# Patient Record
Sex: MALE | Race: WHITE | Employment: PART TIME | URBAN - METROPOLITAN AREA
[De-identification: names, ages, dates, MRNs, and addresses within clinical notes are randomized per-mention and may not be internally consistent; named-entity substitution may affect disease eponyms.]

---

## 2019-10-15 ENCOUNTER — ANESTHESIA EVENT (OUTPATIENT)
Dept: SURGERY | Facility: CLINIC | Age: 39
DRG: 138 | End: 2019-10-15

## 2019-10-15 ENCOUNTER — ANESTHESIA (OUTPATIENT)
Dept: SURGERY | Facility: CLINIC | Age: 39
DRG: 138 | End: 2019-10-15

## 2019-10-15 ENCOUNTER — APPOINTMENT (OUTPATIENT)
Dept: CT IMAGING | Facility: CLINIC | Age: 39
DRG: 138 | End: 2019-10-15
Attending: EMERGENCY MEDICINE

## 2019-10-15 ENCOUNTER — HOSPITAL ENCOUNTER (INPATIENT)
Facility: CLINIC | Age: 39
LOS: 1 days | Discharge: HOME | DRG: 138 | End: 2019-10-16
Attending: EMERGENCY MEDICINE | Admitting: DENTIST

## 2019-10-15 DIAGNOSIS — K12.2 LUDWIG'S ANGINA: ICD-10-CM

## 2019-10-15 DIAGNOSIS — L02.01 FACIAL ABSCESS: Primary | ICD-10-CM

## 2019-10-15 LAB
ANION GAP SERPL CALCULATED.3IONS-SCNC: 14 MMOL/L (ref 3–14)
BASOPHILS # BLD AUTO: 0 10E9/L (ref 0–0.2)
BASOPHILS NFR BLD AUTO: 0.2 %
BUN SERPL-MCNC: 16 MG/DL (ref 7–30)
CALCIUM SERPL-MCNC: 9.3 MG/DL (ref 8.5–10.1)
CHLORIDE SERPL-SCNC: 100 MMOL/L (ref 94–109)
CO2 SERPL-SCNC: 22 MMOL/L (ref 20–32)
CREAT BLD-MCNC: 0.9 MG/DL (ref 0.66–1.25)
CREAT SERPL-MCNC: 0.96 MG/DL (ref 0.66–1.25)
DIFFERENTIAL METHOD BLD: ABNORMAL
EOSINOPHIL # BLD AUTO: 0 10E9/L (ref 0–0.7)
EOSINOPHIL NFR BLD AUTO: 0.1 %
ERYTHROCYTE [DISTWIDTH] IN BLOOD BY AUTOMATED COUNT: 14 % (ref 10–15)
GFR SERPL CREATININE-BSD FRML MDRD: >90 ML/MIN/{1.73_M2}
GFR SERPL CREATININE-BSD FRML MDRD: >90 ML/MIN/{1.73_M2}
GLUCOSE BLDC GLUCOMTR-MCNC: 83 MG/DL (ref 70–99)
GLUCOSE SERPL-MCNC: 100 MG/DL (ref 70–99)
HCT VFR BLD AUTO: 42.9 % (ref 40–53)
HGB BLD-MCNC: 13.9 G/DL (ref 13.3–17.7)
IMM GRANULOCYTES # BLD: 0.1 10E9/L (ref 0–0.4)
IMM GRANULOCYTES NFR BLD: 0.5 %
LACTATE BLD-SCNC: 0.7 MMOL/L (ref 0.7–2)
LYMPHOCYTES # BLD AUTO: 1.4 10E9/L (ref 0.8–5.3)
LYMPHOCYTES NFR BLD AUTO: 8.4 %
MCH RBC QN AUTO: 27.3 PG (ref 26.5–33)
MCHC RBC AUTO-ENTMCNC: 32.4 G/DL (ref 31.5–36.5)
MCV RBC AUTO: 84 FL (ref 78–100)
MONOCYTES # BLD AUTO: 1.7 10E9/L (ref 0–1.3)
MONOCYTES NFR BLD AUTO: 10.1 %
NEUTROPHILS # BLD AUTO: 13.1 10E9/L (ref 1.6–8.3)
NEUTROPHILS NFR BLD AUTO: 80.7 %
NRBC # BLD AUTO: 0 10*3/UL
NRBC BLD AUTO-RTO: 0 /100
PLATELET # BLD AUTO: 241 10E9/L (ref 150–450)
POTASSIUM SERPL-SCNC: 3.7 MMOL/L (ref 3.4–5.3)
RBC # BLD AUTO: 5.1 10E12/L (ref 4.4–5.9)
SODIUM SERPL-SCNC: 136 MMOL/L (ref 133–144)
WBC # BLD AUTO: 16.3 10E9/L (ref 4–11)

## 2019-10-15 PROCEDURE — 00000146 ZZHCL STATISTIC GLUCOSE BY METER IP

## 2019-10-15 PROCEDURE — 80048 BASIC METABOLIC PNL TOTAL CA: CPT | Performed by: EMERGENCY MEDICINE

## 2019-10-15 PROCEDURE — 25000565 ZZH ISOFLURANE, EA 15 MIN: Performed by: DENTIST

## 2019-10-15 PROCEDURE — 25000128 H RX IP 250 OP 636: Performed by: ANESTHESIOLOGY

## 2019-10-15 PROCEDURE — 85025 COMPLETE CBC W/AUTO DIFF WBC: CPT | Performed by: EMERGENCY MEDICINE

## 2019-10-15 PROCEDURE — 96366 THER/PROPH/DIAG IV INF ADDON: CPT

## 2019-10-15 PROCEDURE — 82565 ASSAY OF CREATININE: CPT

## 2019-10-15 PROCEDURE — 25800030 ZZH RX IP 258 OP 636: Performed by: ANESTHESIOLOGY

## 2019-10-15 PROCEDURE — 96365 THER/PROPH/DIAG IV INF INIT: CPT

## 2019-10-15 PROCEDURE — 87076 CULTURE ANAEROBE IDENT EACH: CPT | Performed by: DENTIST

## 2019-10-15 PROCEDURE — 87070 CULTURE OTHR SPECIMN AEROBIC: CPT | Performed by: DENTIST

## 2019-10-15 PROCEDURE — 93010 ELECTROCARDIOGRAM REPORT: CPT | Mod: 59 | Performed by: INTERNAL MEDICINE

## 2019-10-15 PROCEDURE — 25000566 ZZH SEVOFLURANE, EA 15 MIN: Performed by: DENTIST

## 2019-10-15 PROCEDURE — 87205 SMEAR GRAM STAIN: CPT | Performed by: DENTIST

## 2019-10-15 PROCEDURE — 25000125 ZZHC RX 250: Performed by: STUDENT IN AN ORGANIZED HEALTH CARE EDUCATION/TRAINING PROGRAM

## 2019-10-15 PROCEDURE — 87186 SC STD MICRODIL/AGAR DIL: CPT | Performed by: DENTIST

## 2019-10-15 PROCEDURE — 83605 ASSAY OF LACTIC ACID: CPT | Performed by: EMERGENCY MEDICINE

## 2019-10-15 PROCEDURE — 87185 SC STD ENZYME DETCJ PER NZM: CPT | Performed by: DENTIST

## 2019-10-15 PROCEDURE — 71000015 ZZH RECOVERY PHASE 1 LEVEL 2 EA ADDTL HR: Performed by: DENTIST

## 2019-10-15 PROCEDURE — 71000014 ZZH RECOVERY PHASE 1 LEVEL 2 FIRST HR: Performed by: DENTIST

## 2019-10-15 PROCEDURE — 25000128 H RX IP 250 OP 636: Performed by: EMERGENCY MEDICINE

## 2019-10-15 PROCEDURE — 40000170 ZZH STATISTIC PRE-PROCEDURE ASSESSMENT II: Performed by: DENTIST

## 2019-10-15 PROCEDURE — 0J910ZZ DRAINAGE OF FACE SUBCUTANEOUS TISSUE AND FASCIA, OPEN APPROACH: ICD-10-PCS | Performed by: DENTIST

## 2019-10-15 PROCEDURE — 87077 CULTURE AEROBIC IDENTIFY: CPT | Performed by: DENTIST

## 2019-10-15 PROCEDURE — 99285 EMERGENCY DEPT VISIT HI MDM: CPT | Mod: Z6 | Performed by: EMERGENCY MEDICINE

## 2019-10-15 PROCEDURE — 27210794 ZZH OR GENERAL SUPPLY STERILE: Performed by: DENTIST

## 2019-10-15 PROCEDURE — 25800030 ZZH RX IP 258 OP 636: Performed by: STUDENT IN AN ORGANIZED HEALTH CARE EDUCATION/TRAINING PROGRAM

## 2019-10-15 PROCEDURE — 36000051 ZZH SURGERY LEVEL 2 1ST 30 MIN - UMMC: Performed by: DENTIST

## 2019-10-15 PROCEDURE — 99285 EMERGENCY DEPT VISIT HI MDM: CPT | Mod: 25

## 2019-10-15 PROCEDURE — 0CDXXZ1 EXTRACTION OF LOWER TOOTH, MULTIPLE, EXTERNAL APPROACH: ICD-10-PCS | Performed by: DENTIST

## 2019-10-15 PROCEDURE — 96375 TX/PRO/DX INJ NEW DRUG ADDON: CPT

## 2019-10-15 PROCEDURE — 70491 CT SOFT TISSUE NECK W/DYE: CPT

## 2019-10-15 PROCEDURE — 12000001 ZZH R&B MED SURG/OB UMMC

## 2019-10-15 PROCEDURE — 36000053 ZZH SURGERY LEVEL 2 EA 15 ADDTL MIN - UMMC: Performed by: DENTIST

## 2019-10-15 PROCEDURE — 37000008 ZZH ANESTHESIA TECHNICAL FEE, 1ST 30 MIN: Performed by: DENTIST

## 2019-10-15 PROCEDURE — 25000128 H RX IP 250 OP 636: Performed by: STUDENT IN AN ORGANIZED HEALTH CARE EDUCATION/TRAINING PROGRAM

## 2019-10-15 PROCEDURE — 37000009 ZZH ANESTHESIA TECHNICAL FEE, EACH ADDTL 15 MIN: Performed by: DENTIST

## 2019-10-15 PROCEDURE — 25000125 ZZHC RX 250: Performed by: DENTIST

## 2019-10-15 PROCEDURE — 87075 CULTR BACTERIA EXCEPT BLOOD: CPT | Performed by: DENTIST

## 2019-10-15 PROCEDURE — 40000065 ZZH STATISTIC EKG NON-CHARGEABLE

## 2019-10-15 RX ORDER — ONDANSETRON 2 MG/ML
4 INJECTION INTRAMUSCULAR; INTRAVENOUS EVERY 6 HOURS PRN
Status: DISCONTINUED | OUTPATIENT
Start: 2019-10-15 | End: 2019-10-16 | Stop reason: HOSPADM

## 2019-10-15 RX ORDER — AMOXICILLIN 250 MG
1 CAPSULE ORAL 2 TIMES DAILY
Status: DISCONTINUED | OUTPATIENT
Start: 2019-10-15 | End: 2019-10-16 | Stop reason: HOSPADM

## 2019-10-15 RX ORDER — PROPOFOL 10 MG/ML
INJECTION, EMULSION INTRAVENOUS PRN
Status: DISCONTINUED | OUTPATIENT
Start: 2019-10-15 | End: 2019-10-15

## 2019-10-15 RX ORDER — NALOXONE HYDROCHLORIDE 0.4 MG/ML
.1-.4 INJECTION, SOLUTION INTRAMUSCULAR; INTRAVENOUS; SUBCUTANEOUS
Status: DISCONTINUED | OUTPATIENT
Start: 2019-10-15 | End: 2019-10-16 | Stop reason: HOSPADM

## 2019-10-15 RX ORDER — SODIUM CHLORIDE 9 MG/ML
INJECTION, SOLUTION INTRAVENOUS CONTINUOUS
Status: DISCONTINUED | OUTPATIENT
Start: 2019-10-15 | End: 2019-10-16 | Stop reason: HOSPADM

## 2019-10-15 RX ORDER — SODIUM CHLORIDE, SODIUM LACTATE, POTASSIUM CHLORIDE, CALCIUM CHLORIDE 600; 310; 30; 20 MG/100ML; MG/100ML; MG/100ML; MG/100ML
INJECTION, SOLUTION INTRAVENOUS CONTINUOUS PRN
Status: DISCONTINUED | OUTPATIENT
Start: 2019-10-15 | End: 2019-10-15

## 2019-10-15 RX ORDER — PROCHLORPERAZINE 25 MG
25 SUPPOSITORY, RECTAL RECTAL EVERY 12 HOURS PRN
Status: DISCONTINUED | OUTPATIENT
Start: 2019-10-15 | End: 2019-10-16 | Stop reason: HOSPADM

## 2019-10-15 RX ORDER — HYDROMORPHONE HYDROCHLORIDE 1 MG/ML
0.5 INJECTION, SOLUTION INTRAMUSCULAR; INTRAVENOUS; SUBCUTANEOUS ONCE
Status: COMPLETED | OUTPATIENT
Start: 2019-10-15 | End: 2019-10-15

## 2019-10-15 RX ORDER — PROCHLORPERAZINE MALEATE 5 MG
10 TABLET ORAL EVERY 6 HOURS PRN
Status: DISCONTINUED | OUTPATIENT
Start: 2019-10-15 | End: 2019-10-16 | Stop reason: HOSPADM

## 2019-10-15 RX ORDER — DEXAMETHASONE SODIUM PHOSPHATE 4 MG/ML
INJECTION, SOLUTION INTRA-ARTICULAR; INTRALESIONAL; INTRAMUSCULAR; INTRAVENOUS; SOFT TISSUE PRN
Status: DISCONTINUED | OUTPATIENT
Start: 2019-10-15 | End: 2019-10-15

## 2019-10-15 RX ORDER — IBUPROFEN 600 MG/1
600 TABLET, FILM COATED ORAL EVERY 6 HOURS
Status: DISCONTINUED | OUTPATIENT
Start: 2019-10-16 | End: 2019-10-16 | Stop reason: HOSPADM

## 2019-10-15 RX ORDER — OXYCODONE HYDROCHLORIDE 5 MG/1
5 TABLET ORAL EVERY 4 HOURS PRN
Status: DISCONTINUED | OUTPATIENT
Start: 2019-10-15 | End: 2019-10-16

## 2019-10-15 RX ORDER — BUPIVACAINE HYDROCHLORIDE AND EPINEPHRINE 5; 5 MG/ML; UG/ML
INJECTION, SOLUTION PERINEURAL PRN
Status: DISCONTINUED | OUTPATIENT
Start: 2019-10-15 | End: 2019-10-15 | Stop reason: HOSPADM

## 2019-10-15 RX ORDER — SODIUM CHLORIDE, SODIUM LACTATE, POTASSIUM CHLORIDE, CALCIUM CHLORIDE 600; 310; 30; 20 MG/100ML; MG/100ML; MG/100ML; MG/100ML
INJECTION, SOLUTION INTRAVENOUS CONTINUOUS
Status: DISCONTINUED | OUTPATIENT
Start: 2019-10-15 | End: 2019-10-15 | Stop reason: HOSPADM

## 2019-10-15 RX ORDER — ONDANSETRON 4 MG/1
4 TABLET, ORALLY DISINTEGRATING ORAL EVERY 30 MIN PRN
Status: DISCONTINUED | OUTPATIENT
Start: 2019-10-15 | End: 2019-10-15 | Stop reason: HOSPADM

## 2019-10-15 RX ORDER — AMPICILLIN AND SULBACTAM 2; 1 G/1; G/1
3 INJECTION, POWDER, FOR SOLUTION INTRAMUSCULAR; INTRAVENOUS ONCE
Status: COMPLETED | OUTPATIENT
Start: 2019-10-15 | End: 2019-10-15

## 2019-10-15 RX ORDER — FENTANYL CITRATE 50 UG/ML
INJECTION, SOLUTION INTRAMUSCULAR; INTRAVENOUS PRN
Status: DISCONTINUED | OUTPATIENT
Start: 2019-10-15 | End: 2019-10-15

## 2019-10-15 RX ORDER — ONDANSETRON 2 MG/ML
INJECTION INTRAMUSCULAR; INTRAVENOUS PRN
Status: DISCONTINUED | OUTPATIENT
Start: 2019-10-15 | End: 2019-10-15

## 2019-10-15 RX ORDER — CHLORHEXIDINE GLUCONATE ORAL RINSE 1.2 MG/ML
15 SOLUTION DENTAL 2 TIMES DAILY
Status: DISCONTINUED | OUTPATIENT
Start: 2019-10-16 | End: 2019-10-16 | Stop reason: HOSPADM

## 2019-10-15 RX ORDER — AMOXICILLIN 250 MG
2 CAPSULE ORAL 2 TIMES DAILY
Status: DISCONTINUED | OUTPATIENT
Start: 2019-10-15 | End: 2019-10-16 | Stop reason: HOSPADM

## 2019-10-15 RX ORDER — HYDROMORPHONE HYDROCHLORIDE 1 MG/ML
.3-.5 INJECTION, SOLUTION INTRAMUSCULAR; INTRAVENOUS; SUBCUTANEOUS EVERY 5 MIN PRN
Status: DISCONTINUED | OUTPATIENT
Start: 2019-10-15 | End: 2019-10-15 | Stop reason: HOSPADM

## 2019-10-15 RX ORDER — NALOXONE HYDROCHLORIDE 0.4 MG/ML
.1-.4 INJECTION, SOLUTION INTRAMUSCULAR; INTRAVENOUS; SUBCUTANEOUS
Status: DISCONTINUED | OUTPATIENT
Start: 2019-10-15 | End: 2019-10-15

## 2019-10-15 RX ORDER — ACETAMINOPHEN 325 MG/1
650 TABLET ORAL EVERY 6 HOURS SCHEDULED
Status: DISCONTINUED | OUTPATIENT
Start: 2019-10-16 | End: 2019-10-16 | Stop reason: HOSPADM

## 2019-10-15 RX ORDER — AMPICILLIN AND SULBACTAM 2; 1 G/1; G/1
3 INJECTION, POWDER, FOR SOLUTION INTRAMUSCULAR; INTRAVENOUS EVERY 6 HOURS SCHEDULED
Status: DISCONTINUED | OUTPATIENT
Start: 2019-10-16 | End: 2019-10-16 | Stop reason: HOSPADM

## 2019-10-15 RX ORDER — ONDANSETRON 2 MG/ML
4 INJECTION INTRAMUSCULAR; INTRAVENOUS EVERY 30 MIN PRN
Status: DISCONTINUED | OUTPATIENT
Start: 2019-10-15 | End: 2019-10-15 | Stop reason: HOSPADM

## 2019-10-15 RX ORDER — ONDANSETRON 4 MG/1
4 TABLET, ORALLY DISINTEGRATING ORAL EVERY 6 HOURS PRN
Status: DISCONTINUED | OUTPATIENT
Start: 2019-10-15 | End: 2019-10-16 | Stop reason: HOSPADM

## 2019-10-15 RX ORDER — OXYCODONE HYDROCHLORIDE 5 MG/1
5-10 TABLET ORAL EVERY 4 HOURS PRN
Status: DISCONTINUED | OUTPATIENT
Start: 2019-10-15 | End: 2019-10-16 | Stop reason: HOSPADM

## 2019-10-15 RX ORDER — IOPAMIDOL 755 MG/ML
100 INJECTION, SOLUTION INTRAVASCULAR ONCE
Status: COMPLETED | OUTPATIENT
Start: 2019-10-15 | End: 2019-10-15

## 2019-10-15 RX ORDER — FENTANYL CITRATE 50 UG/ML
25-50 INJECTION, SOLUTION INTRAMUSCULAR; INTRAVENOUS
Status: DISCONTINUED | OUTPATIENT
Start: 2019-10-15 | End: 2019-10-15 | Stop reason: HOSPADM

## 2019-10-15 RX ORDER — LIDOCAINE 40 MG/G
CREAM TOPICAL
Status: DISCONTINUED | OUTPATIENT
Start: 2019-10-15 | End: 2019-10-16 | Stop reason: HOSPADM

## 2019-10-15 RX ADMIN — SODIUM CHLORIDE, POTASSIUM CHLORIDE, SODIUM LACTATE AND CALCIUM CHLORIDE: 600; 310; 30; 20 INJECTION, SOLUTION INTRAVENOUS at 22:45

## 2019-10-15 RX ADMIN — HYDROMORPHONE HYDROCHLORIDE 0.5 MG: 1 INJECTION, SOLUTION INTRAMUSCULAR; INTRAVENOUS; SUBCUTANEOUS at 22:18

## 2019-10-15 RX ADMIN — HYDROMORPHONE HYDROCHLORIDE 0.5 MG: 1 INJECTION, SOLUTION INTRAMUSCULAR; INTRAVENOUS; SUBCUTANEOUS at 18:31

## 2019-10-15 RX ADMIN — FENTANYL CITRATE 50 MCG: 50 INJECTION, SOLUTION INTRAMUSCULAR; INTRAVENOUS at 20:37

## 2019-10-15 RX ADMIN — IOPAMIDOL 100 ML: 755 INJECTION, SOLUTION INTRAVENOUS at 17:50

## 2019-10-15 RX ADMIN — FENTANYL CITRATE 100 MCG: 50 INJECTION, SOLUTION INTRAMUSCULAR; INTRAVENOUS at 20:11

## 2019-10-15 RX ADMIN — FENTANYL CITRATE 100 MCG: 50 INJECTION, SOLUTION INTRAMUSCULAR; INTRAVENOUS at 20:01

## 2019-10-15 RX ADMIN — FENTANYL CITRATE 50 MCG: 50 INJECTION INTRAMUSCULAR; INTRAVENOUS at 21:42

## 2019-10-15 RX ADMIN — HYDROMORPHONE HYDROCHLORIDE 0.5 MG: 1 INJECTION, SOLUTION INTRAMUSCULAR; INTRAVENOUS; SUBCUTANEOUS at 21:59

## 2019-10-15 RX ADMIN — AMPICILLIN SODIUM AND SULBACTAM SODIUM 3 G: 2; 1 INJECTION, POWDER, FOR SOLUTION INTRAMUSCULAR; INTRAVENOUS at 16:53

## 2019-10-15 RX ADMIN — SODIUM CHLORIDE 1000 ML: 9 INJECTION, SOLUTION INTRAVENOUS at 16:52

## 2019-10-15 RX ADMIN — FENTANYL CITRATE 25 MCG: 50 INJECTION INTRAMUSCULAR; INTRAVENOUS at 21:28

## 2019-10-15 RX ADMIN — PROPOFOL 150 MG: 10 INJECTION, EMULSION INTRAVENOUS at 20:01

## 2019-10-15 RX ADMIN — SODIUM CHLORIDE, POTASSIUM CHLORIDE, SODIUM LACTATE AND CALCIUM CHLORIDE: 600; 310; 30; 20 INJECTION, SOLUTION INTRAVENOUS at 19:56

## 2019-10-15 RX ADMIN — FENTANYL CITRATE 50 MCG: 50 INJECTION INTRAMUSCULAR; INTRAVENOUS at 21:32

## 2019-10-15 RX ADMIN — ROCURONIUM BROMIDE 10 MG: 10 INJECTION INTRAVENOUS at 20:20

## 2019-10-15 RX ADMIN — ONDANSETRON 4 MG: 2 INJECTION INTRAMUSCULAR; INTRAVENOUS at 20:50

## 2019-10-15 RX ADMIN — FENTANYL CITRATE 50 MCG: 50 INJECTION INTRAMUSCULAR; INTRAVENOUS at 21:52

## 2019-10-15 RX ADMIN — FENTANYL CITRATE 25 MCG: 50 INJECTION INTRAMUSCULAR; INTRAVENOUS at 21:37

## 2019-10-15 RX ADMIN — SUGAMMADEX 200 MG: 100 INJECTION, SOLUTION INTRAVENOUS at 20:49

## 2019-10-15 RX ADMIN — ROCURONIUM BROMIDE 60 MG: 10 INJECTION INTRAVENOUS at 20:04

## 2019-10-15 RX ADMIN — DEXAMETHASONE SODIUM PHOSPHATE 8 MG: 4 INJECTION, SOLUTION INTRA-ARTICULAR; INTRALESIONAL; INTRAMUSCULAR; INTRAVENOUS; SOFT TISSUE at 20:01

## 2019-10-15 RX ADMIN — SODIUM CHLORIDE 1000 ML: 900 INJECTION, SOLUTION INTRAVENOUS at 18:39

## 2019-10-15 ASSESSMENT — ENCOUNTER SYMPTOMS
FEVER: 1
SHORTNESS OF BREATH: 0
ABDOMINAL PAIN: 0

## 2019-10-15 NOTE — ED TRIAGE NOTES
Pt is Peruvian speaking only. Pt arrives from dental clinic with known oral abscess.MD aware. Pt reports that he hasn't had much to eat during the last few days. Pt reports jaw pain/swelling/discomfort.

## 2019-10-15 NOTE — ED PROVIDER NOTES
History     Chief Complaint   Patient presents with     Jaw Pain     Oral Abcess     HPI  Pito Pitts is a 39 year old male who had a filling come out of his right lower molar last week.  He has worsening toothache there and now swelling and to the space under his jaw, becoming much worse over the last 2 days.  He has not been able to swallow or drink since yesterday.  He feels that his breathing is comfortable but is more comfortable standing.  He has fevers and severe pain in that area.  He has no past history of infections there and is currently not on antibiotics.  He was seen in the oral surgery clinic where there is suspicious for Leobardo's angina and referred in the ED for CT and preparation for the OR.    I have reviewed the Medications, Allergies, Past Medical and Surgical History, and Social History in the Epic system.    Review of Systems   Constitutional: Positive for fever.   Respiratory: Negative for shortness of breath.    Cardiovascular: Negative for chest pain.   Gastrointestinal: Negative for abdominal pain.   All other systems reviewed and are negative.      Physical Exam   BP: (!) 151/99  Pulse: 112  Heart Rate: 129  Temp: 100.6  F (38.1  C)  Resp: 12  Weight: 105.7 kg (233 lb 1.6 oz)  SpO2: 100 %      Physical Exam  Constitutional:       General: He is not in acute distress.     Appearance: He is not diaphoretic.   HENT:      Head: Atraumatic.      Jaw: Trismus present.        Mouth/Throat:      Mouth: Mucous membranes are dry.      Pharynx: No oropharyngeal exudate.   Eyes:      General: No scleral icterus.     Pupils: Pupils are equal, round, and reactive to light.   Cardiovascular:      Heart sounds: Normal heart sounds.   Pulmonary:      Effort: No respiratory distress.      Breath sounds: Normal breath sounds.   Abdominal:      General: Bowel sounds are normal.      Palpations: Abdomen is soft.      Tenderness: There is no tenderness.   Musculoskeletal:         General: No tenderness.    Skin:     General: Skin is warm.      Findings: No rash.         ED Course        Procedures             Critical Care time:  none             Labs Ordered and Resulted from Time of ED Arrival Up to the Time of Departure from the ED   CBC WITH PLATELETS DIFFERENTIAL - Abnormal; Notable for the following components:       Result Value    WBC 16.3 (*)     Absolute Neutrophil 13.1 (*)     Absolute Monocytes 1.7 (*)     All other components within normal limits   BASIC METABOLIC PANEL - Abnormal; Notable for the following components:    Glucose 100 (*)     All other components within normal limits   LACTIC ACID WHOLE BLOOD   CREATININE POCT            Assessments & Plan (with Medical Decision Making)   The patient has signs of Leobardo's angina on exam consistent with the concerns of the oral surgeon.  He has tachycardia, low-grade fever and elevated white blood cell count.  He does appear dehydrated and is getting IV fluids and Unasyn was started in the ED.  CT scan was arranged and I updated the oral surgery team at 1700 with the patient's status.    CT scan confirmed the submandibular abscess.  Oral surgery is here seeing the patient.  He was given IV Dilaudid for pain, continues to get IV fluids and his antibiotics and will be transferred to the operating room for incision and drainage.    I have reviewed the nursing notes.    I have reviewed the findings, diagnosis, plan and need for follow up with the patient.    There are no discharge medications for this patient.      Final diagnoses:   Leobardo's angina       10/15/2019   [facility], [place], EMERGENCY DEPARTMENT     Keven Duval MD  10/15/19 2015

## 2019-10-15 NOTE — H&P
ORAL & MAXILLOFACIAL SURGERY CONSULTATION   Name: Pito Pitts  MRN: [id number]  : 1980  Date of Service: 10/15/2019          ID:  39 year old male patient presenting with with pain and swelling of the submandibular space.    Assessment/Plan: 39 year old man patient with decayed and non restorable tooth #30 and 31 and submental space infection. We plan removal of tooth #30 and 31 with incision and drainage of the submental space in the OR        Recommendations:   IV ABX (Unasyn)  NPO  HOB 30   Continuous pulse oximetry  Page OMFS resident on call for questions or concerns      The patient's case was discussed with Chief Resident, Dr. Dobbs who discussed with staff surgeon, Dr. Joya.    History of Present Illness:  The patient reports pain and swelling that began on Thursday 10/10/19 and has been getting progressively worse. The patient was referred by [facility] ED for evaluation.  The patient reports odynophagia / denies f/c/n/v/ dyspnea, dysphagia, and SOB.  The patient has been taking ibuprofen to help manage the pain. Has not been taking antibiotics.   Patient is able to control secretions.     No Known Allergies    There is no problem list on file for this patient.      Past Medical History:   Diagnosis Date     HTN (hypertension)        No past surgical history on file.    Relevant Patient or Family Anesthesia History:  Anesthetic/Sedation History: Denies past adverse experience with sedation / anesthesia      Current Facility-Administered Medications:      0.9% sodium chloride BOLUS, 1,000 mL, Intravenous, Once, Last Rate: 1,000 mL/hr at 10/15/19 1652, 1,000 mL at 10/15/19 1652 **FOLLOWED BY** 0.9% sodium chloride BOLUS, 1,000 mL, Intravenous, Once **FOLLOWED BY** sodium chloride 0.9% infusion, , Intravenous, Continuous, Keven Duval MD     ampicillin-sulbactam (UNASYN) 3 g vial to attach to  mL bag, 3 g, Intravenous, Once, Keven Duval MD, Last Rate: 200 mL/hr at  10/15/19 1653, 3 g at 10/15/19 1653  No current outpatient medications on file.    Social History     Socioeconomic History     Marital status:      Spouse name: Not on file     Number of children: Not on file     Years of education: Not on file     Highest education level: Not on file   Occupational History     Not on file   Social Needs     Financial resource strain: Not on file     Food insecurity:     Worry: Not on file     Inability: Not on file     Transportation needs:     Medical: Not on file     Non-medical: Not on file   Tobacco Use     Smoking status: Not on file   Substance and Sexual Activity     Alcohol use: Not on file     Drug use: Not on file     Sexual activity: Not on file   Lifestyle     Physical activity:     Days per week: Not on file     Minutes per session: Not on file     Stress: Not on file   Relationships     Social connections:     Talks on phone: Not on file     Gets together: Not on file     Attends Anabaptism service: Not on file     Active member of club or organization: Not on file     Attends meetings of clubs or organizations: Not on file     Relationship status: Not on file     Intimate partner violence:     Fear of current or ex partner: Not on file     Emotionally abused: Not on file     Physically abused: Not on file     Forced sexual activity: Not on file   Other Topics Concern     Not on file   Social History Narrative     Not on file       No family history on file.    Review of Systems:  As per HPI otherwise noncontributory    Clinical Exam:  B/P: 151/99, T: 100.6, P: Data Unavailable, R: 12    Lab Results   Component Value Date    WBC 16.3 (H) 10/15/2019    HGB 13.9 10/15/2019    HCT 42.9 10/15/2019     10/15/2019     10/15/2019    POTASSIUM 3.7 10/15/2019    CHLORIDE 100 10/15/2019    CO2 PENDING 10/15/2019    BUN PENDING 10/15/2019    CR PENDING 10/15/2019    GLC PENDING 10/15/2019       Extraoral Exam:    Patient is alert, orientated and in no acute  distress   CN II-XII grossly intact   There is appreciable facial swelling of submental region of the face.    The swelling does slightly extend into the neck periorbital region.    Intraoral Exam:    The patient does not have trismus with maximum incisal opening of 35 mm though guarding due to pain.   There is no evidence of a sinus tract associated with dentition   There is no vestibular swelling   The floor of mouth is soft and slightly elevated    The tongue is not elevated.   There is no lateral pharyngeal swelling with the uvula midline   There is no  palatal draping present.   The patient was quite tender to palpation in the area of #31 and 32, and floor of mouth   Oral airway is patent with a Mallampati IV classification   Gross decay noted on teeth #30 and 31      Cardiovascular:  Regular rate and rhythm without any appreciable murmurs, gallops or rubs.     Respiratory: Lungs were clear to auscultation bilaterally without any wheezing or rhonchi.   Abdomen:  The abdomen was soft, nontender, nondistended with normoactive bowel sounds.   Musculoskeletal: The extremities reveal 5/5 strength in bilateral upper as well as bilateral lower extremities.           Radiographic Exam:  CT Face with contrast shows gross decay on tooth #30 and 31 with periapical radiolucencies at apex of roots and associated submandibular space infection. No other bony pathology noted.    Assessment/Plan:      Risks, complications and alternatives of tooth extraction and incision and drainage procedures were discussed and questions were answered.  Among all potential risks and complications, I emphasized the potential for pain, bleeding, swelling, infection, localized alveolar osteitis (dry socket), temporary and permanent lingual and inferior alveolar nerve injury, oroantral (sinus) communication, oronasal communication, jaw fracture, damage to adjacent teeth and tissue, joint discomfort, bone/tooth fragments, recurrence of infection,  need for additional procedures, facial nerve injury, scarring, limited mouth opening, drain placement, aspiration and anesthetic mishap.       Terrence Ocasio DDS  [facility] [place] PGY-3  P: [phone]

## 2019-10-15 NOTE — ANESTHESIA PREPROCEDURE EVALUATION
Anesthesia Pre-Procedure Evaluation    Patient: Pito Pitts   MRN:     [id number] Gender:   male   Age:    39 year old :      1980        Preoperative Diagnosis: * No pre-op diagnosis entered *   Procedure(s):  INCISION AND DRAINAGE DENTAL EXTRACTION     Past Medical History:   Diagnosis Date     HTN (hypertension)       No past surgical history on file.       Anesthesia Evaluation     . Pt has not had prior anesthetic            ROS/MED HX    ENT/Pulmonary: Comment: Submental space infection        Neurologic:  - neg neurologic ROS     Cardiovascular:     (+) hypertension----. : . . . :. .       METS/Exercise Tolerance:  >4 METS   Hematologic:  - neg hematologic  ROS       Musculoskeletal:  - neg musculoskeletal ROS       GI/Hepatic:  - neg GI/hepatic ROS       Renal/Genitourinary:  - ROS Renal section negative       Endo:  - neg endo ROS       Psychiatric:  - neg psychiatric ROS       Infectious Disease: Comment: Submental space infection with infected teeth  (+) Recent Fever, Other Infectious Disease oral abscess      Malignancy:      - no malignancy   Other:    - neg other ROS                     PHYSICAL EXAM:   Mental Status/Neuro: A/A/O   Airway: Facies: Feasible  Mallampati: IV  Mouth/Opening: Limited  TM distance: > 6 cm  Neck ROM: Full   Respiratory: Auscultation: CTAB     Resp. Rate: Normal     Resp. Effort: Normal      CV: Rhythm: Regular  Rate: Age appropriate  Heart: Normal Sounds  Edema: None   Comments: Able to open mouth 2-3 fingerbreaths, limited by pain     Dental: Normal Dentition                LABS:  CBC:   Lab Results   Component Value Date    WBC 16.3 (H) 10/15/2019    HGB 13.9 10/15/2019    HCT 42.9 10/15/2019     10/15/2019     BMP:   Lab Results   Component Value Date     10/15/2019    POTASSIUM 3.7 10/15/2019    CHLORIDE 100 10/15/2019    CO2 22 10/15/2019    BUN 16 10/15/2019    CR 0.96 10/15/2019     (H) 10/15/2019     COAGS: No results found for: PTT,  INR, FIBR  POC: No results found for: BGM, HCG, HCGS  OTHER:   Lab Results   Component Value Date    LACT 0.7 10/15/2019    MACKENZIE 9.3 10/15/2019        Preop Vitals    BP Readings from Last 3 Encounters:   10/15/19 (!) 155/98    Pulse Readings from Last 3 Encounters:   10/15/19 112      Resp Readings from Last 3 Encounters:   10/15/19 16    SpO2 Readings from Last 3 Encounters:   10/15/19 98%      Temp Readings from Last 1 Encounters:   10/15/19 38.1  C (100.6  F) (Oral)    Ht Readings from Last 1 Encounters:   No data found for Ht      Wt Readings from Last 1 Encounters:   10/15/19 105.7 kg (233 lb 1.6 oz)    There is no height or weight on file to calculate BMI.     LDA:  Peripheral IV 10/15/19 Left (Active)   Site Assessment WDL 10/15/2019  4:44 PM   Line Status Saline locked 10/15/2019  4:44 PM   Number of days: 0        Assessment:   ASA SCORE: 2 emergent   H&P: History and physical reviewed and following examination; no interval change.   Smoking Status:  Non-Smoker/Unknown   NPO Status: NPO Appropriate     Plan:   Anes. Type:  General   Pre-Medication: None   Induction:  IV (Standard)   Airway: ETT; Oral; FOB; CMAC/VL   Access/Monitoring: PIV; 2nd PIV   Maintenance: Balanced     Postop Plan:   Postop Pain: Opioids  Postop Sedation/Airway: Not planned  Disposition: Inpatient/Admit     PONV Management:   Adult Risk Factors:, Non-Smoker, Postop Opioids   Prevention: Ondansetron, Dexamethasone     CONSENT: Direct conversation   Plan and risks discussed with: Patient   Blood Products: Consent Deferred (Minimal Blood Loss)       Comments for Plan/Consent:  Plan for asleep intubation given reasonable mouth opening, plan for video laryngoscopy with fiberoptic bronchoscope ready as backup                 Samantha Isidro MD

## 2019-10-16 ENCOUNTER — DOCUMENTATION ONLY (OUTPATIENT)
Dept: CARE COORDINATION | Facility: CLINIC | Age: 39
End: 2019-10-16

## 2019-10-16 ENCOUNTER — OFFICE VISIT (OUTPATIENT)
Dept: INTERPRETER SERVICES | Facility: CLINIC | Age: 39
End: 2019-10-16

## 2019-10-16 VITALS
OXYGEN SATURATION: 95 % | DIASTOLIC BLOOD PRESSURE: 83 MMHG | HEART RATE: 82 BPM | TEMPERATURE: 96.6 F | WEIGHT: 233.1 LBS | RESPIRATION RATE: 16 BRPM | SYSTOLIC BLOOD PRESSURE: 141 MMHG

## 2019-10-16 LAB
ANION GAP SERPL CALCULATED.3IONS-SCNC: 9 MMOL/L (ref 3–14)
BUN SERPL-MCNC: 14 MG/DL (ref 7–30)
CALCIUM SERPL-MCNC: 8.7 MG/DL (ref 8.5–10.1)
CHLORIDE SERPL-SCNC: 105 MMOL/L (ref 94–109)
CO2 SERPL-SCNC: 22 MMOL/L (ref 20–32)
CREAT SERPL-MCNC: 0.66 MG/DL (ref 0.66–1.25)
ERYTHROCYTE [DISTWIDTH] IN BLOOD BY AUTOMATED COUNT: 14 % (ref 10–15)
GFR SERPL CREATININE-BSD FRML MDRD: >90 ML/MIN/{1.73_M2}
GLUCOSE SERPL-MCNC: 161 MG/DL (ref 70–99)
GRAM STN SPEC: ABNORMAL
HCT VFR BLD AUTO: 40.1 % (ref 40–53)
HGB BLD-MCNC: 12.8 G/DL (ref 13.3–17.7)
INTERPRETATION ECG - MUSE: NORMAL
MCH RBC QN AUTO: 27.5 PG (ref 26.5–33)
MCHC RBC AUTO-ENTMCNC: 31.9 G/DL (ref 31.5–36.5)
MCV RBC AUTO: 86 FL (ref 78–100)
PLATELET # BLD AUTO: 208 10E9/L (ref 150–450)
POTASSIUM SERPL-SCNC: 4.1 MMOL/L (ref 3.4–5.3)
RBC # BLD AUTO: 4.65 10E12/L (ref 4.4–5.9)
SODIUM SERPL-SCNC: 135 MMOL/L (ref 133–144)
SPECIMEN SOURCE: ABNORMAL
WBC # BLD AUTO: 17.7 10E9/L (ref 4–11)

## 2019-10-16 PROCEDURE — 25000132 ZZH RX MED GY IP 250 OP 250 PS 637: Performed by: STUDENT IN AN ORGANIZED HEALTH CARE EDUCATION/TRAINING PROGRAM

## 2019-10-16 PROCEDURE — 80048 BASIC METABOLIC PNL TOTAL CA: CPT | Performed by: STUDENT IN AN ORGANIZED HEALTH CARE EDUCATION/TRAINING PROGRAM

## 2019-10-16 PROCEDURE — 85027 COMPLETE CBC AUTOMATED: CPT | Performed by: STUDENT IN AN ORGANIZED HEALTH CARE EDUCATION/TRAINING PROGRAM

## 2019-10-16 PROCEDURE — 25800030 ZZH RX IP 258 OP 636: Performed by: STUDENT IN AN ORGANIZED HEALTH CARE EDUCATION/TRAINING PROGRAM

## 2019-10-16 PROCEDURE — 25000128 H RX IP 250 OP 636: Performed by: STUDENT IN AN ORGANIZED HEALTH CARE EDUCATION/TRAINING PROGRAM

## 2019-10-16 PROCEDURE — 36415 COLL VENOUS BLD VENIPUNCTURE: CPT | Performed by: STUDENT IN AN ORGANIZED HEALTH CARE EDUCATION/TRAINING PROGRAM

## 2019-10-16 PROCEDURE — T1013 SIGN LANG/ORAL INTERPRETER: HCPCS | Mod: U3

## 2019-10-16 RX ORDER — IBUPROFEN 200 MG
TABLET ORAL
Status: ON HOLD | COMMUNITY
End: 2019-10-16

## 2019-10-16 RX ORDER — CHLORHEXIDINE GLUCONATE ORAL RINSE 1.2 MG/ML
15 SOLUTION DENTAL 2 TIMES DAILY
Qty: 473 ML | Refills: 0 | Status: SHIPPED | OUTPATIENT
Start: 2019-10-16

## 2019-10-16 RX ORDER — IBUPROFEN 600 MG/1
600 TABLET, FILM COATED ORAL EVERY 6 HOURS
Qty: 30 TABLET | Refills: 0 | Status: SHIPPED | OUTPATIENT
Start: 2019-10-16

## 2019-10-16 RX ORDER — ACETAMINOPHEN 325 MG/1
650 TABLET ORAL EVERY 4 HOURS PRN
Qty: 60 TABLET | Refills: 0 | Status: SHIPPED | OUTPATIENT
Start: 2019-10-16

## 2019-10-16 RX ORDER — ACETAMINOPHEN 500 MG
TABLET ORAL
Status: ON HOLD | COMMUNITY
End: 2019-10-16

## 2019-10-16 RX ORDER — AMLODIPINE BESYLATE 10 MG/1
TABLET ORAL
COMMUNITY

## 2019-10-16 RX ORDER — NAPROXEN SODIUM 220 MG
220 TABLET ORAL
Status: ON HOLD | COMMUNITY
End: 2019-10-16

## 2019-10-16 RX ADMIN — IBUPROFEN 600 MG: 600 TABLET, FILM COATED ORAL at 02:40

## 2019-10-16 RX ADMIN — AMPICILLIN SODIUM AND SULBACTAM SODIUM 3 G: 2; 1 INJECTION, POWDER, FOR SOLUTION INTRAMUSCULAR; INTRAVENOUS at 12:28

## 2019-10-16 RX ADMIN — SODIUM CHLORIDE: 9 INJECTION, SOLUTION INTRAVENOUS at 00:17

## 2019-10-16 RX ADMIN — IBUPROFEN 600 MG: 600 TABLET, FILM COATED ORAL at 09:01

## 2019-10-16 RX ADMIN — ACETAMINOPHEN 650 MG: 325 TABLET, FILM COATED ORAL at 12:28

## 2019-10-16 RX ADMIN — AMPICILLIN SODIUM AND SULBACTAM SODIUM 3 G: 2; 1 INJECTION, POWDER, FOR SOLUTION INTRAMUSCULAR; INTRAVENOUS at 06:09

## 2019-10-16 RX ADMIN — CHLORHEXIDINE GLUCONATE 0.12% ORAL RINSE 15 ML: 1.2 LIQUID ORAL at 07:39

## 2019-10-16 RX ADMIN — ACETAMINOPHEN 650 MG: 325 TABLET, FILM COATED ORAL at 06:08

## 2019-10-16 RX ADMIN — ACETAMINOPHEN 650 MG: 325 TABLET, FILM COATED ORAL at 00:18

## 2019-10-16 RX ADMIN — IBUPROFEN 600 MG: 600 TABLET, FILM COATED ORAL at 15:19

## 2019-10-16 RX ADMIN — OXYCODONE HYDROCHLORIDE 10 MG: 5 TABLET ORAL at 00:18

## 2019-10-16 RX ADMIN — SENNOSIDES AND DOCUSATE SODIUM 2 TABLET: 8.6; 5 TABLET ORAL at 00:18

## 2019-10-16 RX ADMIN — AMPICILLIN SODIUM AND SULBACTAM SODIUM 3 G: 2; 1 INJECTION, POWDER, FOR SOLUTION INTRAMUSCULAR; INTRAVENOUS at 00:57

## 2019-10-16 RX ADMIN — SENNOSIDES AND DOCUSATE SODIUM 2 TABLET: 8.6; 5 TABLET ORAL at 07:39

## 2019-10-16 RX ADMIN — CHLORHEXIDINE GLUCONATE 0.12% ORAL RINSE 15 ML: 1.2 LIQUID ORAL at 00:18

## 2019-10-16 ASSESSMENT — ACTIVITIES OF DAILY LIVING (ADL)
FALL_HISTORY_WITHIN_LAST_SIX_MONTHS: NO
ADLS_ACUITY_SCORE: 10
SWALLOWING: 0-->SWALLOWS FOODS/LIQUIDS WITHOUT DIFFICULTY
ADLS_ACUITY_SCORE: 15
RETIRED_COMMUNICATION: 0-->UNDERSTANDS/COMMUNICATES WITHOUT DIFFICULTY
DRESS: 0-->INDEPENDENT
RETIRED_EATING: 0-->INDEPENDENT
TOILETING: 0-->INDEPENDENT
AMBULATION: 0-->INDEPENDENT
ADLS_ACUITY_SCORE: 10
COGNITION: 0 - NO COGNITION ISSUES REPORTED
ADLS_ACUITY_SCORE: 10
TRANSFERRING: 0-->INDEPENDENT
BATHING: 0-->INDEPENDENT

## 2019-10-16 NOTE — PHARMACY-ADMISSION MEDICATION HISTORY
Admission medication history interview status for the 10/15/2019 admission is complete. See Epic admission navigator for allergy information, pharmacy, prior to admission medications and immunization status.     Medication history interview sources:  Patient, outside meds list, [facility]    Changes made to PTA medication list (reason)  Added:   Acetaminophen 500 mg- Take 1-2 tablets by mouth every 6 hours as needed for pain per patient reported use  Ibuprofen 200 mg tablets- Take 1 tablet by mouth every 4 hours as needed for pain per patient reported use  Naproxen 220 mg tablets- Take 1 tablet by mouth twice daily with meals as needed for pain per patient reported use  Amlodipine 10 mg tablets- Take 1 tablet by mouth daily per patient reported use    Deleted: None    Changed: None    Additional medication history information (including reliability of information, actions taken by pharmacist):    Patient was coherent and a good historian of his medications. Patient speaks Portuguese and an  was used for this interview.    - Patient says that he took 4 tablets of naproxen and 4 tablets on ibuprofen Monday evening (10/14/19) for his jaw pain    - Patient says that he last took acetaminophen for his pain over 2 weeks ago and has been using naproxen and ibuprofen instead    - Patient was not sure what blood pressure medication he was taking, but says he last took in on Monday morning (10/14/19). [facility] fill records show amlodipine 10 mg tablets were picked up 10/7/19      Prior to Admission medications    Medication Sig Last Dose Taking? Auth Provider   acetaminophen (TYLENOL) 500 MG tablet Take 1-2 tablets (500-1000 mg) by mouth every 6 hours as needed for pain Past Month at Unknown time Yes Unknown, Entered By History   amLODIPine (NORVASC) 10 MG tablet Take 1 tablet (10 mg) by mouth daily 10/14/2019 at AM Yes Unknown, Entered By History   ibuprofen (ADVIL/MOTRIN) 200 MG tablet Take 1 tablet (200 mg) by mouth  every 4 hours as needed for pain 10/14/2019 at PM Yes Unknown, Entered By History   naproxen sodium (FLANAX PAIN RELIEF) 220 MG tablet 220 mg Take 1 tablet (220 mg) by mouth two times daily with meals as needed for pain 10/14/2019 at PM Yes Unknown, Entered By History       Medication history completed by:   Jos Mae, PharmD Candidate 2020 October 16, 2019

## 2019-10-16 NOTE — PLAN OF CARE
VSS. Alert and oriented x4, Romansh speaking. Denied pain, utilizing scheduled tylenol and ibuprofen. Tolerating liquids. Denied nausea. Penrose drains to right neck, small serosanguinous drainage. Up independently. Voiding. Discharge paperwork reviewed with patient via  and all questions answered. Pt verbalized understanding. Discharge meds obtained from pharmacy and given to patient. Will discharge when ride is here.

## 2019-10-16 NOTE — OR NURSING
Wallet with cash debit card and photo ID placed in security envelope and security called to  envelope. Envelope brought to PACU where security will   envelope.

## 2019-10-16 NOTE — PLAN OF CARE
Status: Pt admitted from PACU this am at midnight. POD 1 R molar extraction x3, and R jaw I&D.  Vitals: HTN within parameters. Other VSS.  Neuros: A/O x4, Intact. Macanese speaking.   IV: PIV infusing Ns125/hr btwn IV ancef.  Resp/trach: 1L O2 via NC, capno in place  Diet: Adv. Diet as tolerated. Has had water overnight  Bowel status: BS+ No BM overnight  : Voiding  Skin: R jaw area incision w/2 penrose drains. Gauze/krilex on neck, mod. Amt of sanguineous drng from drains. Dressing chg x3  Pain: Jaw/mouth pain managed w/sched tylenol and ibuprofen and PRN oxycodone.  Activity: Up w/SBA  Plan: Continue to monitor and follow POC.

## 2019-10-16 NOTE — PROGRESS NOTES
Per Mobility Level Guideline;  Bed/chair alarm No.  Patient may ambulate independently  Patient requires the following assistive equipment: none  PT/OT consult orders in place No

## 2019-10-16 NOTE — ANESTHESIA CARE TRANSFER NOTE
Patient: Pito Pitts    Procedure(s):  INCISION AND DRAINAGE right submental space,  DENTAL EXTRACTION #30, 31, 32    Diagnosis: * No pre-op diagnosis entered *  Diagnosis Additional Information: No value filed.    Anesthesia Type:   General     Note:  Airway :Face Mask  Patient transferred to:PACU  Comments: Extubated in OR 16, transferred to PACU with oxygen, hemodynamically stable. Upon arrival to PACU, pain is nil, no nausea. SpO2 98% on 8L O2 via face mask.     Samantha Isidro MDHandoff Report: Identifed the Patient, Identified the Reponsible Provider, Reviewed the pertinent medical history, Discussed the surgical course, Reviewed Intra-OP anesthesia mangement and issues during anesthesia, Set expectations for post-procedure period and Allowed opportunity for questions and acknowledgement of understanding      Vitals: (Last set prior to Anesthesia Care Transfer)    CRNA VITALS  10/15/2019 2024 - 10/15/2019 2107      10/15/2019             Resp Rate (observed):  (!) 3                Electronically Signed By: Samantha Isidro MD  October 15, 2019  9:07 PM

## 2019-10-16 NOTE — BRIEF OP NOTE
[facility], [place]    Brief Operative Note    Pre-operative diagnosis: Submental space infection  Post-operative diagnosis Same as pre-operative diagnosis    Procedure: Procedure(s):  INCISION AND DRAINAGE right submental space,  DENTAL EXTRACTION #30, 31, 32  Surgeon: Surgeon(s) and Role:     * Jose Joya DDS - Primary     * Robert Dobbs MD - Resident - Assisting     * Jose Ocasio MD - Resident - Assisting  Anesthesia: General   Estimated blood loss: 50 cc  Drains: 3 penrose drains placed.   Specimens:   ID Type Source Tests Collected by Time Destination   1 : Right neck Fluid Neck ANAEROBIC BACTERIAL CULTURE, FLUID CULTURE AEROBIC BACTERIAL, GRAM STAIN Jose Joya DDS 10/15/2019  8:25 PM      Findings:   None.  Complications: None.  Implants: * No implants in log *

## 2019-10-16 NOTE — ANESTHESIA POSTPROCEDURE EVALUATION
Anesthesia POST Procedure Evaluation    Patient: Pito Pitts   MRN:     [id number] Gender:   male   Age:    39 year old :      1980        Preoperative Diagnosis: * No pre-op diagnosis entered *   Procedure(s):  INCISION AND DRAINAGE right submental space,  DENTAL EXTRACTION #30, 31, 32   Postop Comments: No value filed.       Anesthesia Type:  Not documented  General    Reportable Event: NO     PAIN: Uncomplicated   Sign Out status: Comfortable, Well controlled pain     PONV: No PONV   Sign Out status:  No Nausea or Vomiting     Neuro/Psych: Uneventful perioperative course   Sign Out Status: Preoperative baseline; Age appropriate mentation     Airway/Resp.: Uneventful perioperative course   Sign Out Status: Non labored breathing, age appropriate RR; Resp. Status within EXPECTED Parameters     CV: Uneventful perioperative course   Sign Out status: Appropriate BP and perfusion indices; Appropriate HR/Rhythm     Disposition:   Sign Out in:  PACU  Disposition:  Phase II; Home  Recovery Course: Uneventful  Follow-Up: Not required           Last Anesthesia Record Vitals:  CRNA VITALS  10/15/2019 2024 - 10/15/2019 2110      10/15/2019             Resp Rate (observed):  (!) 3          Last PACU Vitals:  Vitals Value Taken Time   /85 10/15/2019  9:01 PM   Temp     Pulse 122 10/15/2019  9:01 PM   Resp     SpO2 94 % 10/15/2019  9:09 PM   Temp src     NIBP     Pulse     SpO2     Resp     Temp     Ht Rate     Temp 2     Vitals shown include unvalidated device data.      Electronically Signed By: Marialuisa Braun MD, October 15, 2019, 9:10 PM

## 2019-10-17 NOTE — OP NOTE
OPERATIVE REPORT - PGY-4     Pito Pitts   : 1980   Sex: male   MRN: [id number]  10/16/2019 7:30 PM       DATE OF SERVICE: 10/15/2019  STAFF SURGEON: Jose Joya DDS   RESIDENT SURGEON: Jose Ocasio DDS  RESIDENT ASSIST: Gurjit Dobbs DDS   PREOPERATIVE DIAGNOSIS: bilateral submandibular and submental space abscess, dental caries  POSTOPERATIVE DIAGNOSIS: bilateral submandibular and submental space abscess, dental caries  PROCEDURES PERFORMED: Incision and drainage, extraction of teeth #s 30, 31, 32  ANESTHESIA: General Anesthesia with oral endotracheal intubation  INDICATIONS FOR OPERATION: 40 y/o man with bilateral submandibular and submental space abscess secondary to dental caries     Risks and Complications were discussed with the patient including, but not limited to pain, swelling, bleeding, infection, bruising, scarring, damage to adjacent teeth and structures, damage to nerves resulting in temporary and/or permanent numbness of the V2/V3 nerve distributions (tongue, lips, cheeks, and chin), facial paralysis (temporary/permanent), retained tooth fragments, irregular alveolar bone, failure of wounds to heal, failure of treatment, failure of hardware, need for additional treatment/procedures, failure to resolve the chief complaint, and any other unforseen complications.   present.     On 10/14/2019, Pito Pitts was encountered at [facility] and was greeted by the OMFS team in the preop holding area. Risks of the procedure discussed as noted above. Patient elected to proceed. The anesthesia team met with the patient and the H/P and consent were reviewed. The patient was transported to OR and transferred from the [facility] to the OR table. Patient was tucked and padded. ASA monitors were applied and the patient underwent a smooth IV induction via oral endotracheal intubation. Placement was verified with bilateral breath sounds and end tidal CO2. The patient was prepped in the customary  fashion for Oral & Maxillofacial Surgery Procedures. Throat pack placed. Oral prep performed. Mini timeout performed according to protocol. Local anesthesia administered. Extraoral prep performed. Surgeon's then scrubbed and returned to don sterile gloves and gown. Patient was draped in a sterile fashion. Time out performed according to [facility] protocol.      Attention was directed to the right neck.  two incisions planned.  One midline submental, one right submadibular, approximatley 2cm below inferior border of the manidble.  Planned incisions were marked with a pen.  local anesthesia was administered.  Next, 18 gauge needle was inserted into the right neck, tracking to the right submandibular space.  5cc of purulence was obtained for sterile aspirate and sent for culture.  Next, incision was made into the right neck, approximately 1.5cm in length.  Blunt dissection was used to reach the inferior mandibular border.  Barrett purulent drainage noted.  Next, submental incision was made, approximately 1.5cm in length.  Blunt dissection performed to the medial aspect of the left mandible.  loculations were broken up with blunt finger dissection.  Next, attention was directed to tooth #30.  #301 straight elevator used to luxate the tooth, which was delivered using #23 forceps.  Socket was curetted and thoroughly irrigated.  Teeth #s 31 and 32 were delivered and treated in similar fashion.  Penrose drains x 3 were placed.  One drain intraorally on the right lingual aspect of the mandible, extending to submandibular incision, one drain from midline submental to the left submandibular space, and one drain between incisions.    Drains were secured to the skin with 2-0 silk sutures.  intraoral drain secured to mucosa using 3-0 chromic gut.  Copious irrigation used.      Needle and sponge counts correct (x2). Throat pack removed. OG tube passed and removed to decompress the stomach contents. The patient was turned over to the  "Anesthesia Service, had a smooth emergence from anesthesia, and was extubated.  Following extubation the patient was medically stable and in no apparent respiratory distress.  The patient was transferred via gurney to the post anesthesia care unit (PACU).  The patient was to be monitored in the PACU for post-operative pain management and medical stability.  Once PACU discharge criteria is met the patient was to be discharged to home with post-operative instructions, medications, and follow-up appointment information.       ESTIMATED BLOOD LOSS: 50cc   FLUIDS: see anesthesia record for details    URINE OUTPUT: not recorded   DRAINS: penrose drains, 3/8\" x 3 as described above   COMPLICATIONS: none   SPECIMENS: sterile aspirate for culture   FINDINGS: expected   DISPOSITION: PACU, plan for admit  CONDITION: stable      Robert Dobbs DDS       "

## 2019-10-17 NOTE — DISCHARGE SUMMARY
Oral and Maxillofacial Surgery Discharge Summary  Pito Pitts MRN: [id number]  1980  Primary care provider: No Ref-Primary, Physician  ___________________________________          Date of Admission:  10/15/2019  Date of Discharge:  10/16/2019   Admitting Physician:  Jose Joya DDS  Discharge Physician:  No att. providers found   Discharging Service:  Oral and Maxillofacial Surgery         Reason for Admission:   Patient admitted 10/15/19 after incision and drainage of submental and submandibular space infections with extraction of teeth #30, 31, 32 in OR for post operative monitoring of airway swelling, pain and disease course.           Discharge Diagnosis:   Right submental space abscess. Dental caries.          Procedures & Significant Findings:   10/15/19: incision and drainage of submental and submandibular space infections with extraction of teeth #30, 31, 32          Consultations:   None         Hospital Course:    The patient was admitted to [facility] on 10/15/19 and taken to the operating room for incision and drainage of submental and submandibular space infections with extraction of teeth #30, 31, 32 .  The surgical procedure was performed under general anesthesia without any apparent complications.  Post-operatively the patient was admitted to the Oral & Maxillofacial Surgery Service for overnight monitoring and pain management.  The patient did well overnight and on post-operative day 1 the decision was made to discharge the patient home with post-operative instructions, medications and follow-up appointment information.  At the time of discharge, the patient's pain was well controlled on PO analgesics and the patient was tolerating PO liquid intake without any nausea/vomiting.  Pt was voiding well, had passed gas and vital signs were noted to be stable.          Physical Exam on day of Discharge:  Vital signs:  Temp: 96.6  F (35.9  C) Temp src: Axillary BP: (!) 141/83 Pulse: 82 Heart  Rate: 79 Resp: 16 SpO2: 95 % O2 Device: Nasal cannula Oxygen Delivery: 1 LPM   Weight: 105.7 kg (233 lb 1.6 oz)  There is no height or weight on file to calculate BMI.    HEENT:   Head: Lower facial swelling R>L consistent with infection and surgery   Ears: external auricles in tact, no drainage   Eyes: PERRL and EOMI, no blurred vision, sclera white   Nose: Nares patent, no epistaxis   Neck/Throat: Neck softer to palpation, still with edema and improving induration. Serosanguinous drainage on palpation, mild purulence on fluffs placed over drains.     Oral: Drains in tact, ext sites with clots in place, FOM is tender with mild firmness, no purulence expressed on palpation. LANIE 2 FBs. Able to tolerate secretions.   CV: RRR  Pulm: Lungs CTAB, no wheezing or crackles, no apparent respiratory distress  Abd: Soft, NT/ND, + bowel sounds   Ext: 5/5 strength bilateral upper and lower extremities, no apparent sensory deficits    Neuro: A&Ox3, answers questions appropriately,     Lines/Tubes:  None         Pending Results:   None          Discharge Medications:     Discharge Medication List as of 10/16/2019  2:57 PM      START taking these medications    Details   amoxicillin-clavulanate (AUGMENTIN) 875-125 MG tablet Take 1 tablet by mouth 2 times daily, Disp-14 tablet, R-0, Local Print      chlorhexidine (PERIDEX) 0.12 % solution Swish and spit 15 mLs in mouth 2 times daily, Disp-473 mL, R-0, Local Print         CONTINUE these medications which have CHANGED    Details   acetaminophen (TYLENOL) 325 MG tablet Take 2 tablets (650 mg) by mouth every 4 hours as needed for mild pain, Disp-60 tablet, R-0, Local Print      ibuprofen (ADVIL/MOTRIN) 600 MG tablet Take 1 tablet (600 mg) by mouth every 6 hours, Disp-30 tablet, R-0, Local Print         CONTINUE these medications which have NOT CHANGED    Details   amLODIPine (NORVASC) 10 MG tablet Take 1 tablet (10 mg) by mouth daily, Historical         STOP taking these medications        naproxen sodium (FLANAX PAIN RELIEF) 220 MG tablet Comments:   Reason for Stopping:                    Discharge Instructions and Follow-Up:     Discharge Procedure Orders   Reason for your hospital stay   Order Comments: You were admitted 10/15 post operatively after incision and drainage of submental abscess. On 10/16/ you were deemed acceptable for discharge.     Activity   Order Comments: Your activity upon discharge: activity as tolerated     Order Specific Question Answer Comments   Is discharge order? Yes      Discharge Instructions   Order Comments: Follow up at [address] [place] [address] 7th floor [facility] 10/18/19 at 1:00pm.   Please take medications as prescribed.     - No lifting greater than 15 lbs, no strenuous exercise for 2 weeks.   - Patient to rest quietly day after surgery  - Patient can resume light active duty on post-operative day #2.  - Patient to maintain good oral hygiene with brushing of teeth 2x/day with soft toothbrush starting day after surgery.  - Please avoid smoking, spitting, drinking through straws or vigoroursly rinsing your mouth.   - After 24 hours, begin gentle salt water rinses, several times a day, especially after eating.   - Some bleeding is normal from surgical areas. We recommend firm pressure, usually by biting on gauze. If bleeding occurs place a moist gauze on the surgical site until active bleeding stops. It is important that the gauze is in proper position and pressure is kept on the area for 20-30 minutes to control bleeding.   - Some swelling and bruising may occur following surgery. This will usually peak in 36-48 hours. We recommend ice pack to area on outside of face. It should stay on 10 minutes then off for a short while so the skin doesn't get cold.   - While sleeping or resting, elevate your head on 2 pillows, it will help with swelling.   - Please call [phone] to ask for oral surgery resident on call if questions or concerns.     Follow Up  ([facility])   Order Comments: Follow up at [address] [place] [address] 7th floor [facility] 10/18/19 at 1:00pm for drain removal.     Full Code     Order Specific Question Answer Comments   Code status determined by: Discussion with patient/legal decision maker      Diet   Order Comments: Follow this diet upon discharge: Diet as tolerated     Order Specific Question Answer Comments   Is discharge order? Yes              Discharge Disposition:   The patient was given discharge instructions and will must be scheduled to follow up with Dr. Joya in the [facility] in approximately 2 days.  The patient or proxy should call [phone] to schedule a follow-up appointment, if they don't already have one.         Condition on Discharge:   Discharge condition: Stable   Code status on discharge: Full Code      Date of service: 10/16/2019    The patient was discussed with Dr. Mahnaz VARGAS MD.    Jos Kincaid DDS  Oral and Maxillofacial Surgery PGY-2

## 2019-10-17 NOTE — PROGRESS NOTES
Patient has clinic visit within 24-48 hours of discharge so no post DC follow up call is needed.    Follow up at [address] [place] [address] 7th [place] [facility] [place]18/19 at 1:00pm.

## 2019-10-18 LAB
BACTERIA SPEC CULT: ABNORMAL
SPECIMEN SOURCE: ABNORMAL

## 2019-10-22 LAB
BACTERIA SPEC CULT: ABNORMAL
BACTERIA SPEC CULT: ABNORMAL
Lab: ABNORMAL
SPECIMEN SOURCE: ABNORMAL

## (undated) DEVICE — NDL 25GA 2"  8881200441

## (undated) DEVICE — NDL SPINAL 18GA 3.5" 405184

## (undated) DEVICE — TOOTHBRUSH ADULT NON STERILE MDS136850

## (undated) DEVICE — DRSG TELFA 3X8" 1238

## (undated) DEVICE — NDL 21GA 1.5"

## (undated) DEVICE — LINEN TOWEL PACK X6 WHITE 5487

## (undated) DEVICE — SYR EAR BULB 3OZ 0035830

## (undated) DEVICE — LINEN TOWEL PACK X5 5464

## (undated) DEVICE — SOL WATER IRRIG 1000ML BOTTLE 2F7114

## (undated) DEVICE — ANTIFOG SOLUTION W/FOAM PAD 31142527

## (undated) DEVICE — SOL NACL 0.9% IRRIG 1000ML BOTTLE 2F7124

## (undated) DEVICE — ESU GROUND PAD ADULT W/CORD E7507

## (undated) DEVICE — TAPE CLOTH 2" CARDINAL 3TRCL02

## (undated) DEVICE — PAD CHUX UNDERPAD 23X24" 7136

## (undated) DEVICE — BLADE KNIFE SURG 15 371115

## (undated) DEVICE — SYR 10ML FINGER CONTROL W/O NDL 309695

## (undated) DEVICE — DRSG JAWBRA  95

## (undated) DEVICE — PREP POVIDONE IODINE SOLUTION 10% 4OZ

## (undated) DEVICE — SPONGE BALL KERLIX ROUND XL W/O STRING LATEX 4935

## (undated) DEVICE — PACK NEURO MINOR UMMC SNE32MNMU4

## (undated) DEVICE — BRUSH SURGICAL EZ SCRUB PLAIN 371603

## (undated) DEVICE — DRSG GAUZE 4X4" TRAY 6939

## (undated) DEVICE — GLOVE PROTEXIS POWDER FREE 8.5 ORTHOPEDIC 2D73ET85

## (undated) DEVICE — DRAPE U SPLIT 74X120" 29440

## (undated) DEVICE — ESU NDL COLORADO MICRO E1651

## (undated) DEVICE — DRAIN PENROSE 3/8X18" LATEX 0918020

## (undated) RX ORDER — LIDOCAINE HYDROCHLORIDE 20 MG/ML
INJECTION, SOLUTION EPIDURAL; INFILTRATION; INTRACAUDAL; PERINEURAL
Status: DISPENSED
Start: 2019-10-15

## (undated) RX ORDER — HYDROMORPHONE HYDROCHLORIDE 1 MG/ML
INJECTION, SOLUTION INTRAMUSCULAR; INTRAVENOUS; SUBCUTANEOUS
Status: DISPENSED
Start: 2019-10-15

## (undated) RX ORDER — FENTANYL CITRATE 50 UG/ML
INJECTION, SOLUTION INTRAMUSCULAR; INTRAVENOUS
Status: DISPENSED
Start: 2019-10-15

## (undated) RX ORDER — ESMOLOL HYDROCHLORIDE 10 MG/ML
INJECTION INTRAVENOUS
Status: DISPENSED
Start: 2019-10-15

## (undated) RX ORDER — BUPIVACAINE HYDROCHLORIDE AND EPINEPHRINE 5; 5 MG/ML; UG/ML
INJECTION, SOLUTION EPIDURAL; INTRACAUDAL; PERINEURAL
Status: DISPENSED
Start: 2019-10-15

## (undated) RX ORDER — KETAMINE HCL IN 0.9 % NACL 50 MG/5 ML
SYRINGE (ML) INTRAVENOUS
Status: DISPENSED
Start: 2019-10-15

## (undated) RX ORDER — PROPOFOL 10 MG/ML
INJECTION, EMULSION INTRAVENOUS
Status: DISPENSED
Start: 2019-10-15